# Patient Record
Sex: FEMALE | Race: WHITE | NOT HISPANIC OR LATINO | URBAN - METROPOLITAN AREA
[De-identification: names, ages, dates, MRNs, and addresses within clinical notes are randomized per-mention and may not be internally consistent; named-entity substitution may affect disease eponyms.]

---

## 2018-06-01 ENCOUNTER — EMERGENCY (EMERGENCY)
Facility: HOSPITAL | Age: 23
LOS: 1 days | Discharge: HOME | End: 2018-06-01
Attending: EMERGENCY MEDICINE
Payer: COMMERCIAL

## 2018-06-01 VITALS
DIASTOLIC BLOOD PRESSURE: 77 MMHG | SYSTOLIC BLOOD PRESSURE: 110 MMHG | RESPIRATION RATE: 20 BRPM | HEART RATE: 99 BPM | OXYGEN SATURATION: 100 % | TEMPERATURE: 100 F

## 2018-06-01 VITALS — WEIGHT: 164.02 LBS

## 2018-06-01 LAB
ALBUMIN SERPL ELPH-MCNC: 3.5 G/DL — SIGNIFICANT CHANGE UP (ref 3.5–5)
ALP SERPL-CCNC: 82 U/L — SIGNIFICANT CHANGE UP (ref 40–120)
ALT FLD-CCNC: 49 U/L DA — SIGNIFICANT CHANGE UP (ref 10–60)
ANION GAP SERPL CALC-SCNC: 11 MMOL/L — SIGNIFICANT CHANGE UP (ref 5–17)
APPEARANCE UR: CLEAR — SIGNIFICANT CHANGE UP
APTT BLD: 32.9 SEC — SIGNIFICANT CHANGE UP (ref 27.5–37.4)
AST SERPL-CCNC: 27 U/L — SIGNIFICANT CHANGE UP (ref 10–40)
BILIRUB SERPL-MCNC: 0.6 MG/DL — SIGNIFICANT CHANGE UP (ref 0.2–1.2)
BILIRUB UR-MCNC: NEGATIVE — SIGNIFICANT CHANGE UP
BUN SERPL-MCNC: 6 MG/DL — LOW (ref 7–18)
CALCIUM SERPL-MCNC: 8.5 MG/DL — SIGNIFICANT CHANGE UP (ref 8.4–10.5)
CHLORIDE SERPL-SCNC: 99 MMOL/L — SIGNIFICANT CHANGE UP (ref 96–108)
CO2 SERPL-SCNC: 25 MMOL/L — SIGNIFICANT CHANGE UP (ref 22–31)
COLOR SPEC: YELLOW — SIGNIFICANT CHANGE UP
CREAT SERPL-MCNC: 0.92 MG/DL — SIGNIFICANT CHANGE UP (ref 0.5–1.3)
DIFF PNL FLD: ABNORMAL
GLUCOSE SERPL-MCNC: 103 MG/DL — HIGH (ref 70–99)
GLUCOSE UR QL: NEGATIVE — SIGNIFICANT CHANGE UP
HCG SERPL-ACNC: <1 MIU/ML — SIGNIFICANT CHANGE UP
HCT VFR BLD CALC: 36.7 % — SIGNIFICANT CHANGE UP (ref 34.5–45)
HGB BLD-MCNC: 12 G/DL — SIGNIFICANT CHANGE UP (ref 11.5–15.5)
INR BLD: 1.56 RATIO — HIGH (ref 0.88–1.16)
KETONES UR-MCNC: NEGATIVE — SIGNIFICANT CHANGE UP
LACTATE SERPL-SCNC: 1 MMOL/L — SIGNIFICANT CHANGE UP (ref 0.7–2)
LACTATE SERPL-SCNC: 2.2 MMOL/L — HIGH (ref 0.7–2)
LEUKOCYTE ESTERASE UR-ACNC: ABNORMAL
LYMPHOCYTES # BLD AUTO: 9 % — LOW (ref 13–44)
MCHC RBC-ENTMCNC: 27.6 PG — SIGNIFICANT CHANGE UP (ref 27–34)
MCHC RBC-ENTMCNC: 32.6 GM/DL — SIGNIFICANT CHANGE UP (ref 32–36)
MCV RBC AUTO: 84.6 FL — SIGNIFICANT CHANGE UP (ref 80–100)
MONOCYTES NFR BLD AUTO: 9 % — SIGNIFICANT CHANGE UP (ref 2–14)
NEUTROPHILS NFR BLD AUTO: 81 % — HIGH (ref 43–77)
NITRITE UR-MCNC: NEGATIVE — SIGNIFICANT CHANGE UP
PH UR: 6 — SIGNIFICANT CHANGE UP (ref 5–8)
PLATELET # BLD AUTO: 235 K/UL — SIGNIFICANT CHANGE UP (ref 150–400)
POTASSIUM SERPL-MCNC: 3 MMOL/L — LOW (ref 3.5–5.3)
POTASSIUM SERPL-SCNC: 3 MMOL/L — LOW (ref 3.5–5.3)
PROT SERPL-MCNC: 8.4 G/DL — HIGH (ref 6–8.3)
PROT UR-MCNC: 30 MG/DL
PROTHROM AB SERPL-ACNC: 17.2 SEC — HIGH (ref 9.8–12.7)
RBC # BLD: 4.34 M/UL — SIGNIFICANT CHANGE UP (ref 3.8–5.2)
RBC # FLD: 11.6 % — SIGNIFICANT CHANGE UP (ref 10.3–14.5)
SODIUM SERPL-SCNC: 135 MMOL/L — SIGNIFICANT CHANGE UP (ref 135–145)
SP GR SPEC: 1.01 — SIGNIFICANT CHANGE UP (ref 1.01–1.02)
UROBILINOGEN FLD QL: 1
WBC # BLD: 19.8 K/UL — HIGH (ref 3.8–10.5)
WBC # FLD AUTO: 19.8 K/UL — HIGH (ref 3.8–10.5)

## 2018-06-01 PROCEDURE — 87086 URINE CULTURE/COLONY COUNT: CPT

## 2018-06-01 PROCEDURE — 85730 THROMBOPLASTIN TIME PARTIAL: CPT

## 2018-06-01 PROCEDURE — 85610 PROTHROMBIN TIME: CPT

## 2018-06-01 PROCEDURE — 93005 ELECTROCARDIOGRAM TRACING: CPT

## 2018-06-01 PROCEDURE — 87040 BLOOD CULTURE FOR BACTERIA: CPT

## 2018-06-01 PROCEDURE — 85027 COMPLETE CBC AUTOMATED: CPT

## 2018-06-01 PROCEDURE — 99284 EMERGENCY DEPT VISIT MOD MDM: CPT

## 2018-06-01 PROCEDURE — 99284 EMERGENCY DEPT VISIT MOD MDM: CPT | Mod: 25

## 2018-06-01 PROCEDURE — 71046 X-RAY EXAM CHEST 2 VIEWS: CPT | Mod: 26

## 2018-06-01 PROCEDURE — 83605 ASSAY OF LACTIC ACID: CPT

## 2018-06-01 PROCEDURE — 71046 X-RAY EXAM CHEST 2 VIEWS: CPT

## 2018-06-01 PROCEDURE — 96375 TX/PRO/DX INJ NEW DRUG ADDON: CPT

## 2018-06-01 PROCEDURE — 80053 COMPREHEN METABOLIC PANEL: CPT

## 2018-06-01 PROCEDURE — 84702 CHORIONIC GONADOTROPIN TEST: CPT

## 2018-06-01 PROCEDURE — 96374 THER/PROPH/DIAG INJ IV PUSH: CPT

## 2018-06-01 PROCEDURE — 81001 URINALYSIS AUTO W/SCOPE: CPT

## 2018-06-01 RX ORDER — MOXIFLOXACIN HYDROCHLORIDE TABLETS, 400 MG 400 MG/1
1 TABLET, FILM COATED ORAL
Qty: 6 | Refills: 0 | OUTPATIENT
Start: 2018-06-01 | End: 2018-06-03

## 2018-06-01 RX ORDER — SODIUM CHLORIDE 9 MG/ML
2000 INJECTION INTRAMUSCULAR; INTRAVENOUS; SUBCUTANEOUS ONCE
Qty: 0 | Refills: 0 | Status: COMPLETED | OUTPATIENT
Start: 2018-06-01 | End: 2018-06-01

## 2018-06-01 RX ORDER — POTASSIUM CHLORIDE 20 MEQ
10 PACKET (EA) ORAL
Qty: 0 | Refills: 0 | Status: DISCONTINUED | OUTPATIENT
Start: 2018-06-01 | End: 2018-06-01

## 2018-06-01 RX ORDER — ACETAMINOPHEN 500 MG
650 TABLET ORAL ONCE
Qty: 0 | Refills: 0 | Status: COMPLETED | OUTPATIENT
Start: 2018-06-01 | End: 2018-06-01

## 2018-06-01 RX ORDER — CEFTRIAXONE 500 MG/1
1 INJECTION, POWDER, FOR SOLUTION INTRAMUSCULAR; INTRAVENOUS ONCE
Qty: 0 | Refills: 0 | Status: COMPLETED | OUTPATIENT
Start: 2018-06-01 | End: 2018-06-01

## 2018-06-01 RX ORDER — ONDANSETRON 8 MG/1
4 TABLET, FILM COATED ORAL ONCE
Qty: 0 | Refills: 0 | Status: COMPLETED | OUTPATIENT
Start: 2018-06-01 | End: 2018-06-01

## 2018-06-01 RX ORDER — POTASSIUM CHLORIDE 20 MEQ
40 PACKET (EA) ORAL ONCE
Qty: 0 | Refills: 0 | Status: COMPLETED | OUTPATIENT
Start: 2018-06-01 | End: 2018-06-01

## 2018-06-01 RX ORDER — KETOROLAC TROMETHAMINE 30 MG/ML
30 SYRINGE (ML) INJECTION ONCE
Qty: 0 | Refills: 0 | Status: DISCONTINUED | OUTPATIENT
Start: 2018-06-01 | End: 2018-06-01

## 2018-06-01 RX ADMIN — Medication 30 MILLIGRAM(S): at 21:26

## 2018-06-01 RX ADMIN — Medication 30 MILLIGRAM(S): at 22:24

## 2018-06-01 RX ADMIN — Medication 40 MILLIEQUIVALENT(S): at 22:46

## 2018-06-01 RX ADMIN — Medication 100 MILLIEQUIVALENT(S): at 21:26

## 2018-06-01 RX ADMIN — ONDANSETRON 4 MILLIGRAM(S): 8 TABLET, FILM COATED ORAL at 19:30

## 2018-06-01 RX ADMIN — Medication 650 MILLIGRAM(S): at 19:31

## 2018-06-01 RX ADMIN — CEFTRIAXONE 100 GRAM(S): 500 INJECTION, POWDER, FOR SOLUTION INTRAMUSCULAR; INTRAVENOUS at 19:31

## 2018-06-01 RX ADMIN — Medication 40 MILLIEQUIVALENT(S): at 21:26

## 2018-06-01 RX ADMIN — SODIUM CHLORIDE 1000 MILLILITER(S): 9 INJECTION INTRAMUSCULAR; INTRAVENOUS; SUBCUTANEOUS at 18:40

## 2018-06-01 NOTE — ED PROVIDER NOTE - MEDICAL DECISION MAKING DETAILS
23 y/o F presents with sepsis of likely urinary source. Will do sepsis workup. Will administer IV fluids and antibiotics.

## 2018-06-01 NOTE — ED PROVIDER NOTE - CHPI ED SYMPTOMS NEG
no photophobia, no changes in vision, no neck pain, no neck stiffness, no hematuria, no dysuria, no back pain, no chest pain, no trouble breathing

## 2018-06-01 NOTE — ED PROVIDER NOTE - OBJECTIVE STATEMENT
23 y/o F with no significant PMHx and no significant PSHx presents to the ED with c/o headache, nausea, fever, and vomiting x1 day. Vomiting is non-blood and non-billious. Pt reports increasing episodes of urinary freqency. Pt denies photophobia, changes in vision, neck pain, neck stiffness, hematuria, dysuria, back pain, chest pain or trouble breathing. No known sick contacts or recent travel. LMP: 2 weeks ago. NKDA.

## 2018-06-02 LAB
CULTURE RESULTS: NO GROWTH — SIGNIFICANT CHANGE UP
SPECIMEN SOURCE: SIGNIFICANT CHANGE UP

## 2018-06-07 LAB
CULTURE RESULTS: SIGNIFICANT CHANGE UP
CULTURE RESULTS: SIGNIFICANT CHANGE UP
SPECIMEN SOURCE: SIGNIFICANT CHANGE UP
SPECIMEN SOURCE: SIGNIFICANT CHANGE UP

## 2021-08-26 NOTE — ED PROVIDER NOTE - NSTOBACCONEVERSMOKERY/N_GEN_A
Outpatient Physical Therapy Ortho Progress Note   Marianna     Patient Name: Nancy Galvan  : 1962  MRN: [id number]  Today's Date: 2021      Visit Date: 2021    Visit Dx:    ICD-10-CM ICD-9-CM   1. Chronic left-sided low back pain without sciatica  M54.5 724.2    G89.29 338.29   2. Impaired functional mobility and activity tolerance  Z74.09 V49.89      Past Medical History:   Diagnosis Date   • OA (osteoarthritis of spine)    • Osteoporosis    • Vitamin D deficiency         Past Surgical History:   Procedure Laterality Date   •  SECTION     • EYE SURGERY       PT Ortho     Row Name 21 1430       Subjective Comments    Subjective Comments  Client reports moderate pain today at 4/10.  She also notes that she stopped with press ups because her elbow was bothering her.  She returns today after being evaluated 4 weeks ago. She missed due to having a procedure completed.   -MM       Precautions and Contraindications    Precautions  osteoporosis  -MM       Posture/Observations    Posture/Observations Comments  Posture: stooped sitting posture, forward head, and decreased lumbar lordosis. Palpation: mod TTP from T11-L5, left PSIS, and proximal gluteals.  -MM       General ROM    GENERAL ROM COMMENTS  Some relief noted performing press ups with overpressure today.  -MM       Head/Neck/Trunk    Trunk Extension AROM  18  -MM    Trunk Flexion AROM  WNL  -MM    Trunk Lt Lateral Flexion AROM  min loss no pain  -MM    Trunk Rt Lateral Flexion AROM  min loss no pain  -MM    Trunk Lt Rotation AROM  WNL  -MM    Trunk Rt Rotation AROM  WNL  -MM       MMT Right Lower Ext    Rt Hip Flexion MMT, Gross Movement  (5/5) normal some pain  -MM    Rt Hip Extension MMT, Gross Movement  (4/5) good  -MM       MMT Left Lower Ext    Lt Hip Extension MMT, Gross Movement  (4/5) good  -MM       Transfers    Comment (Transfers)  mild discomfort w transfers  -MM       Gait/Stairs (Locomotion)    Comment  (Gait/Stairs)  slow karen  -MM      User Key  (r) = Recorded By, (t) = Taken By, (c) = Cosigned By    Initials Name Provider Type    Alverto Martino PT Physical Therapist          PT Assessment/Plan     Row Name 08/26/21 1430          PT Assessment    Functional Limitations  Impaired gait;Limitation in home management;Performance in leisure activities;Performance in self-care ADL;Limitations in community activities  -MM     Impairments  Muscle strength;Pain;Range of motion  -MM     Assessment Comments  Client returns today for follow-up after the initial eval 4 weeks ago.  She was not able to do therapy after that visit due to having a procedure.  She returns today to begin exercise again to minimize back pain.  She does continue to have moderate back pain often.  She also has other pain in her knees.  She has a history of arthritis and osteoporosis.  Skilled care is required to minimize pain and improve overall function.  Client should benefit from strengthening exercises for lower extremity and trunk.  -MM     Please refer to paper survey for additional self-reported information  Yes  -MM     Rehab Potential  Good  -MM     Patient/caregiver participated in establishment of treatment plan and goals  Yes  -MM     Patient would benefit from skilled therapy intervention  Yes  -MM        PT Plan    PT Frequency  1x/week;2x/week  -MM     Predicted Duration of Therapy Intervention (PT)  6-10 visits  -MM     Planned CPT's?  PT THER PROC EA 15 MIN: 33032;PT THER ACT EA 15 MIN: 29039;PT MANUAL THERAPY EA 15 MIN: 73779  -MM     PT Plan Comments  Continue per plan of treatment updating exercises as needed.  -MM       User Key  (r) = Recorded By, (t) = Taken By, (c) = Cosigned By    Initials Name Provider Type    Alverto Martino PT Physical Therapist        OP Exercises     Row Name 08/26/21 1435             Subjective Comments    Subjective Comments  Client reports moderate pain today at 4/10.  She also notes  that she stopped with press ups because her elbow was bothering her.  She returns today after being evaluated 4 weeks ago. She missed due to having a procedure completed.   -MM         Subjective Pain    Able to rate subjective pain?  yes  -MM      Pre-Treatment Pain Level  4  -MM      Post-Treatment Pain Level  3  -MM         Total Minutes    09422 - PT Therapeutic Exercise Minutes  45  -MM         Exercise 1    Exercise Name 1  Time for updating objective measures including therapeutic exercise  -MM      Time 1  5 minutes  -MM         Exercise 2    Exercise Name 2  NuStep crosstrainer  -MM      Time 2  5 minutes  -MM      Additional Comments  Level 6  -MM         Exercise 3    Exercise Name 3  Standing extension using table  -MM      Reps 3  15  -MM         Exercise 4    Exercise Name 4  Standing side glides bilateral  -MM      Reps 4  10  -MM         Exercise 5    Exercise Name 5  Press ups with overpressure  -MM      Reps 5  5/5/5  -MM         Exercise 6    Exercise Name 6  Bicycle kicks and limited range  -MM      Reps 6  15  -MM         Exercise 7    Exercise Name 7  Superman's  -MM      Reps 7  10  -MM         Exercise 8    Exercise Name 8  Prone alternate arm/leg raise  -MM      Reps 8  10  -MM         Exercise 9    Exercise Name 9  Straight leg raise: Flexion, abduction  -MM      Reps 9  10 each  -MM         Exercise 10    Exercise Name 10  Heel/toe raise  -MM      Reps 10  15  -MM         Exercise 11    Exercise Name 11  Bridging  -MM      Reps 11  15  -MM        User Key  (r) = Recorded By, (t) = Taken By, (c) = Cosigned By    Initials Name Provider Type    MM Alverto Calderon, PT Physical Therapist          PT OP Goals     Row Name 08/26/21 1430          PT Short Term Goals    STG Date to Achieve  08/17/21  -MM     STG 1  Client will perform pressups 10x without pain for improved functional mobility.  -MM     STG 1 Progress  Met  -MM     STG 2  Client will improve score on Modified Owestry to 15% or  better for improved activity tolerance.  -MM     STG 2 Progress  Ongoing  -MM     STG 3  Client will improve hip strength to 4+/5 or better with MMT for improved strength.  -MM     STG 3 Progress  Ongoing  -MM        Long Term Goals    LTG Date to Achieve  09/07/21  -MM     LTG 1  Client will perform sit to stand 20x without pain for improved functional mobility.  -MM     LTG 1 Progress  Ongoing;Goal Revised  -MM     LTG 2  Client will improve score on Modified Owestry to 8% or better for improved activity tolerance.  -MM     LTG 2 Progress  Ongoing  -MM     LTG 3  Client will be independent with HEP for maximum potential benefit from skilled PT services.  -MM     LTG 3 Progress  Ongoing  -MM        Time Calculation    PT Goal Re-Cert Due Date  10/25/21  -MM       User Key  (r) = Recorded By, (t) = Taken By, (c) = Cosigned By    Initials Name Provider Type    Alverto Martino, PT Physical Therapist        Outcome Measure Options: Modifed Owestry  Modified Oswestry  Modified Oswestry Score/Comments: 36%      Time Calculation:   Start Time: 1430  Timed Charges  72185 - PT Therapeutic Exercise Minutes: 45  Total Minutes  Timed Charges Total Minutes: 45   Total Minutes: 45  Therapy Charges for Today     Code Description Service Date Service Provider Modifiers Qty    14088260265 HC PT THER PROC EA 15 MIN 8/26/2021 Alverto Calderon, PT GP 3          PT G-Codes  Outcome Measure Options: Modifed Owestry  Modified Oswestry Score/Comments: 36%       Alverto Calderon, PT  8/26/2021      No

## 2021-10-06 NOTE — ED ADULT NURSE NOTE - PRIVACY CONCERNS
BENITA AMARO  87y Female  MRN:[id number]    Patient is a 87y old  Female who presents with a chief complaint of Hematochezia   GI Bleed (05 Oct 2021 19:35)    HPI:   86 y/o F   w/ h/o T2DM, Afib (on Eliquis and metoprolol), diastolic CHF w no reported hx of GIB p/w bright red blood per rectum yesterday and fatigue, sob and cp today. reports bright blood stopped yesterday. no abd pain. no fever chills, no travels sick contacts. unsure last dose of xarelto was.  Hgb 5.4   (04 Oct 2021 08:53)      Patient seen and evaluated at bedside. No acute events overnight except as noted.    Interval HPI: no further bleeding o/n    PAST MEDICAL & SURGICAL HISTORY:  Chronic atrial fibrillation    Dementia    Chronic CHF    HTN (hypertension)    Dementia, senile with depression        REVIEW OF SYSTEMS:  as per hpi     SOC:  denies toxic habits    Fam Hx:  non cont    VITALS:  Vital Signs Last 24 Hrs  T(C): 36.7 (06 Oct 2021 04:36), Max: 36.7 (06 Oct 2021 04:36)  T(F): 98.1 (06 Oct 2021 04:36), Max: 98.1 (06 Oct 2021 04:36)  HR: 97 (06 Oct 2021 04:36) (97 - 155)  BP: 123/77 (06 Oct 2021 04:36) (112/55 - 146/76)  BP(mean): --  RR: 18 (06 Oct 2021 04:36) (18 - 20)  SpO2: 95% (06 Oct 2021 04:36) (95% - 97%)  CAPILLARY BLOOD GLUCOSE      POCT Blood Glucose.: 188 mg/dL (05 Oct 2021 21:17)  POCT Blood Glucose.: 137 mg/dL (05 Oct 2021 17:15)    I&O's Summary    05 Oct 2021 07:01  -  06 Oct 2021 07:00  --------------------------------------------------------  IN: 120 mL / OUT: 100 mL / NET: 20 mL    06 Oct 2021 07:01  -  06 Oct 2021 11:41  --------------------------------------------------------  IN: 0 mL / OUT: 0 mL / NET: 0 mL        PHYSICAL EXAM:  GENERAL: NAD, well-developed  HEAD:  Atraumatic, Normocephalic  EYES: EOMI, PERRLA, conjunctiva and sclera clear  NECK: Supple, No JVD  CHEST/LUNG: Clear to auscultation bilaterally; No wheeze  HEART: S1, S2; No murmurs, rubs, or gallops  ABDOMEN: Soft, Nontender, Nondistended; Bowel sounds present  EXTREMITIES:  2+ Peripheral Pulses, No clubbing, cyanosis, or edema  PSYCH: Normal affect  NEUROLOGY: AAOX3; non-focal  SKIN: No rashes or lesions    Consultant(s) Notes Reviewed:  [x ] YES  [ ] NO  Care Discussed with Consultants/Other Providers [ x] YES  [ ] NO    MEDS:  MEDICATIONS  (STANDING):  influenza   Vaccine 0.5 milliLiter(s) IntraMuscular once  mirtazapine 15 milliGRAM(s) Oral at bedtime  pantoprazole  Injectable 40 milliGRAM(s) IV Push two times a day  QUEtiapine 25 milliGRAM(s) Oral <User Schedule>  QUEtiapine 50 milliGRAM(s) Oral <User Schedule>    MEDICATIONS  (PRN):  haloperidol     Tablet 1 milliGRAM(s) Oral every 4 hours PRN Agitation    ALLERGIES:  No Known Allergies      LABS:                        7.8    11.26 )-----------( 247      ( 06 Oct 2021 07:13 )             25.6     10-06    141  |  106  |  14  ----------------------------<  115<H>  3.4<L>   |  21<L>  |  0.93    Ca    8.8      06 Oct 2021 07:09           < from: CT Abdomen and Pelvis w/ IV Cont (10.04.21 @ 20:59) >  IMPRESSION:  Evaluation of the distal sigmoid colon and rectum is limited secondary to streak artifact related to hip prosthesis; otherwise no evidence of active gastrointestinal bleed.    Questionable soft tissue prominence along the low rectum, which may be artifactual due to underdistention and streak artifact. Consider correlation with endoscopy.    Right renal lesions favored to represent hemorrhagic cysts. Questionable enhancement of a right lower pole lesion versus pseudoenhancement. Consider further evaluation with nonemergent renal ultrasound or contrast enhanced abdominal MRI as clinically indicated.    < end of copied text >   No